# Patient Record
Sex: MALE | NOT HISPANIC OR LATINO | Employment: FULL TIME | URBAN - METROPOLITAN AREA
[De-identification: names, ages, dates, MRNs, and addresses within clinical notes are randomized per-mention and may not be internally consistent; named-entity substitution may affect disease eponyms.]

---

## 2021-03-31 ENCOUNTER — ANCILLARY PROCEDURE (OUTPATIENT)
Dept: GENERAL RADIOLOGY | Facility: CLINIC | Age: 41
End: 2021-03-31
Attending: PHYSICIAN ASSISTANT
Payer: COMMERCIAL

## 2021-03-31 ENCOUNTER — OFFICE VISIT (OUTPATIENT)
Dept: FAMILY MEDICINE | Facility: CLINIC | Age: 41
End: 2021-03-31
Payer: COMMERCIAL

## 2021-03-31 VITALS
RESPIRATION RATE: 16 BRPM | TEMPERATURE: 97.1 F | WEIGHT: 228 LBS | OXYGEN SATURATION: 98 % | DIASTOLIC BLOOD PRESSURE: 82 MMHG | HEART RATE: 62 BPM | BODY MASS INDEX: 30.88 KG/M2 | HEIGHT: 72 IN | SYSTOLIC BLOOD PRESSURE: 126 MMHG

## 2021-03-31 DIAGNOSIS — M79.672 LEFT FOOT PAIN: ICD-10-CM

## 2021-03-31 DIAGNOSIS — M79.672 LEFT FOOT PAIN: Primary | ICD-10-CM

## 2021-03-31 LAB
BASOPHILS # BLD AUTO: 0 10E9/L (ref 0–0.2)
BASOPHILS NFR BLD AUTO: 0.5 %
DIFFERENTIAL METHOD BLD: NORMAL
EOSINOPHIL # BLD AUTO: 0.2 10E9/L (ref 0–0.7)
EOSINOPHIL NFR BLD AUTO: 2 %
ERYTHROCYTE [DISTWIDTH] IN BLOOD BY AUTOMATED COUNT: 12.6 % (ref 10–15)
ERYTHROCYTE [SEDIMENTATION RATE] IN BLOOD BY WESTERGREN METHOD: 14 MM/H (ref 0–15)
HCT VFR BLD AUTO: 44.5 % (ref 40–53)
HGB BLD-MCNC: 14.3 G/DL (ref 13.3–17.7)
LYMPHOCYTES # BLD AUTO: 2.3 10E9/L (ref 0.8–5.3)
LYMPHOCYTES NFR BLD AUTO: 29.1 %
MCH RBC QN AUTO: 29.4 PG (ref 26.5–33)
MCHC RBC AUTO-ENTMCNC: 32.1 G/DL (ref 31.5–36.5)
MCV RBC AUTO: 92 FL (ref 78–100)
MONOCYTES # BLD AUTO: 0.7 10E9/L (ref 0–1.3)
MONOCYTES NFR BLD AUTO: 9.1 %
NEUTROPHILS # BLD AUTO: 4.7 10E9/L (ref 1.6–8.3)
NEUTROPHILS NFR BLD AUTO: 59.3 %
PLATELET # BLD AUTO: 231 10E9/L (ref 150–450)
RBC # BLD AUTO: 4.86 10E12/L (ref 4.4–5.9)
URATE SERPL-MCNC: 6.5 MG/DL (ref 3.5–7.2)
WBC # BLD AUTO: 7.9 10E9/L (ref 4–11)

## 2021-03-31 PROCEDURE — 73630 X-RAY EXAM OF FOOT: CPT | Mod: LT | Performed by: RADIOLOGY

## 2021-03-31 PROCEDURE — 36415 COLL VENOUS BLD VENIPUNCTURE: CPT | Performed by: PHYSICIAN ASSISTANT

## 2021-03-31 PROCEDURE — 85025 COMPLETE CBC W/AUTO DIFF WBC: CPT | Performed by: PHYSICIAN ASSISTANT

## 2021-03-31 PROCEDURE — 84550 ASSAY OF BLOOD/URIC ACID: CPT | Performed by: PHYSICIAN ASSISTANT

## 2021-03-31 PROCEDURE — 85652 RBC SED RATE AUTOMATED: CPT | Performed by: PHYSICIAN ASSISTANT

## 2021-03-31 PROCEDURE — 99213 OFFICE O/P EST LOW 20 MIN: CPT | Performed by: PHYSICIAN ASSISTANT

## 2021-03-31 PROCEDURE — 73610 X-RAY EXAM OF ANKLE: CPT | Mod: LT | Performed by: RADIOLOGY

## 2021-03-31 RX ORDER — INDOMETHACIN 50 MG/1
50 CAPSULE ORAL
Qty: 90 CAPSULE | Refills: 1 | Status: CANCELLED | OUTPATIENT
Start: 2021-03-31

## 2021-03-31 ASSESSMENT — MIFFLIN-ST. JEOR: SCORE: 1987.2

## 2021-03-31 NOTE — PROGRESS NOTES
SUBJECTIVE:   Marquise Garcia is a 40 year old male seen here today for his left Foot  What happened: no injury       Onset: 6 days     Description:   Character: swelling, painful     Intensity: moderate    Progression of Symptoms: better    Accompanying Signs & Symptoms:  Other symptoms: none   History:   Previous similar pain: no       Precipitating factors:   Trauma or overuse: no     Alleviating factors:  Improved by: nothing       Therapies Tried and outcome: ibuprofen help mildly with pain   3/10 now  Was 7-8/10 on fri 26th.   No injury.  No previous problems.  He does drink 1-2 drinks most nights of the week.    ROS:  Constitutional, eye, ENT, skin, respiratory, cardiac, and GI are normal except as otherwise noted.    PFSH:  Past Medical, social, family histories, medications, and allergies were reviewed and updated.     OBJECTIVE:  /82   Pulse 62   Temp 97.1  F (36.2  C) (Tympanic)   Resp 16   Ht 1.829 m (6')   Wt 103.4 kg (228 lb)   SpO2 98%   BMI 30.92 kg/m    General:  Marquise is awake, alert, and cooperative.  NAD.  Left Ankle/ Foot Exam:     examination of his left ankle and foot shows diffuse swelling to the dorsum of his foot and into his ankle.  Mild tenderness diffusely around the medial aspect of his ankle.  He has pain with range of motion but does have full range of motion.  His calves are soft nontender is neuro vas intact distally.    Labs pending  X-ray left ankle and foot: neg. pending radiology    ASSESSMENT:     ICD-10-CM    1. Left foot pain  M79.672 XR Ankle Left G/E 3 Views     CBC with platelets and differential     ESR: Erythrocyte sedimentation rate     Uric acid     XR Foot Left G/E 3 Views       PLAN:   ice or cold packs 20 minutes every 2-3 hrs as needed to relieve pain and swelling, for the first 2 days. Then can apply heat 20 minutes every 2-3 hrs (avoid sleeping on heating pad) there after as needed.   If you can tolerate, start non-steroidal anti-inflammatory  medication like: Ibuprofen 600-800 mg three times daily or Aleve 2 tablets of over the counter strength twice a day as needed.   Tylenol can help with pain also.    Active range of motion exercises encouraged  Activity modification trying to avoid activities that cause you pain.   Follow up 1 wk as needed     Samuel Esteban PA-C

## 2021-03-31 NOTE — LETTER
April 1, 2021      Marquise Garcia  [address] [facility] [address]        Mr. Garcia,     All of your labs were normal/near normal for you.     Please contact the clinic if you have additional questions.  Thank you.     Sincerely,     Samuel Esteban PA-C     Resulted Orders   CBC with platelets and differential   Result Value Ref Range    WBC 7.9 4.0 - 11.0 10e9/L    RBC Count 4.86 4.4 - 5.9 10e12/L    Hemoglobin 14.3 13.3 - 17.7 g/dL    Hematocrit 44.5 40.0 - 53.0 %    MCV 92 78 - 100 fl    MCH 29.4 26.5 - 33.0 pg    MCHC 32.1 31.5 - 36.5 g/dL    RDW 12.6 10.0 - 15.0 %    Platelet Count 231 150 - 450 10e9/L    % Neutrophils 59.3 %    % Lymphocytes 29.1 %    % Monocytes 9.1 %    % Eosinophils 2.0 %    % Basophils 0.5 %    Absolute Neutrophil 4.7 1.6 - 8.3 10e9/L    Absolute Lymphocytes 2.3 0.8 - 5.3 10e9/L    Absolute Monocytes 0.7 0.0 - 1.3 10e9/L    Absolute Eosinophils 0.2 0.0 - 0.7 10e9/L    Absolute Basophils 0.0 0.0 - 0.2 10e9/L    Diff Method Automated Method    ESR: Erythrocyte sedimentation rate   Result Value Ref Range    Sed Rate 14 0 - 15 mm/h   Uric acid   Result Value Ref Range    Uric Acid 6.5 3.5 - 7.2 mg/dL

## 2021-04-01 NOTE — RESULT ENCOUNTER NOTE
MrBeatriz Garcia,    All of your labs were normal/near normal for you.    Please contact the clinic if you have additional questions.  Thank you.    Sincerely,    Samuel Esteban PA-C

## 2021-10-13 NOTE — TELEPHONE ENCOUNTER
Reason for Call:  Medication or medication refill:    Do you use a [facility]?  Name of the pharmacy and phone number for the current request:  [facility] in [place]    Name of the medication requested: There is no prescription refill. They were recommended to call if there were issues with gout in the future, but there isn't a specific anti-inflammatory mentioned in the patient's chart. Advised a virtual visit. Pt's wife declined, stating pt wouldn't want to pay for that.    Pt's wife was advised she is not on file as an emergency contact and I can take in information, but a consent to communicate is helpful for future communication.     Pt may be having symptoms of gout and should be called directly.    Call taken on 10/13/2021 at 3:38 PM by Leti Peacock

## 2021-10-13 NOTE — TELEPHONE ENCOUNTER
"\"The person you called does not have a mailbox that has been set up yet\".      Medications not prescribe previously.      Office visit notes 3/31/21  PLAN:   ice or cold packs 20 minutes every 2-3 hrs as needed to relieve pain and swelling, for the first 2 days. Then can apply heat 20 minutes every 2-3 hrs (avoid sleeping on heating pad) there after as needed.   If you can tolerate, start non-steroidal anti-inflammatory medication like: Ibuprofen 600-800 mg three times daily or Aleve 2 tablets of over the counter strength twice a day as needed.   Tylenol can help with pain also.    Active range of motion exercises encouraged  Activity modification trying to avoid activities that cause you pain.   Follow up 1 wk as needed      Samuel Esteban PA-C     Will need to try call again later.   Terri Hennessy RN     "

## 2021-10-13 NOTE — TELEPHONE ENCOUNTER
Call to patient, discuss concerns, patient reports having gout flare, right foot.    Symptoms started 2 days ago.  Patient is wanting a prescription for the flare.  Reviewed office visit notes from previous office visit, below. Verbalized good understanding.     Took # 1  800 mg dose of Ibuprofen yesterday, and one dose today.  Symptoms are worsening. Cold pack placed yesterday.      Uric Acid   Date Value Ref Range Status   03/31/2021 6.5 3.5 - 7.2 mg/dL Final     No results found for: CR      Please advise  Terri Hennessy RN

## 2021-10-14 NOTE — TELEPHONE ENCOUNTER
Tried to call patient, unable to leave a message, voice mail box is not set up.  María AGARWAL - Caren

## 2021-10-16 ENCOUNTER — TELEPHONE (OUTPATIENT)
Dept: NURSING | Facility: CLINIC | Age: 41
End: 2021-10-16

## 2021-10-16 NOTE — TELEPHONE ENCOUNTER
Pt calling to report that he has another flare of gout at his right ankle, and wants an Rx for the anti-inflammatory medication that was discussed at his first-time gout visit.  Pt states that it should be in his chart.  OTC anti-inflammatories not working.    I advised the pt that he would need to have a virtual visit (as was recommended on 10/13/2021 by Samuel Esteban PA-C), or he can be seen at urgent care for evaluation.    Pt declined both options.  Pt requesting that I forward a message to Dr Kohler, to be addressed on Monday, 10/18/2021.  Teresa Ba RN 10/16/21 3:01 PM  [facility] [place] Nurse Advisor

## 2021-10-19 NOTE — TELEPHONE ENCOUNTER
I have never seen this patient.   I am not going to prescribe for a patient that I have not seen.   I am not sure why this was sent to me.   Reggie Kohler MD

## 2021-10-19 NOTE — TELEPHONE ENCOUNTER
Patient/parent is informed of MD note below, as it is written. Verbalized good understanding.  Offered to schedule appointment, patient declines.   Terri Hennessy RN

## 2021-10-20 NOTE — TELEPHONE ENCOUNTER
see other telephone message from 10/16/21. Patient was offered an appointment by the RN, but the patient declined. Closing this encounter.Roxann Mccarthy MA/WENDI

## 2022-04-15 ENCOUNTER — OFFICE VISIT (OUTPATIENT)
Dept: FAMILY MEDICINE | Facility: CLINIC | Age: 42
End: 2022-04-15
Payer: COMMERCIAL

## 2022-04-15 VITALS
WEIGHT: 228 LBS | HEART RATE: 66 BPM | DIASTOLIC BLOOD PRESSURE: 86 MMHG | BODY MASS INDEX: 30.88 KG/M2 | SYSTOLIC BLOOD PRESSURE: 120 MMHG | TEMPERATURE: 97.7 F | HEIGHT: 72 IN | OXYGEN SATURATION: 97 %

## 2022-04-15 DIAGNOSIS — R00.2 PALPITATIONS: ICD-10-CM

## 2022-04-15 DIAGNOSIS — Z00.00 ROUTINE GENERAL MEDICAL EXAMINATION AT A HEALTH CARE FACILITY: Primary | ICD-10-CM

## 2022-04-15 DIAGNOSIS — Z13.1 SCREENING FOR DIABETES MELLITUS: ICD-10-CM

## 2022-04-15 DIAGNOSIS — K62.5 RECTAL BLEEDING: ICD-10-CM

## 2022-04-15 DIAGNOSIS — F10.20 UNCOMPLICATED ALCOHOL DEPENDENCE (H): ICD-10-CM

## 2022-04-15 DIAGNOSIS — Z13.220 SCREENING FOR HYPERLIPIDEMIA: ICD-10-CM

## 2022-04-15 LAB
ANION GAP SERPL CALCULATED.3IONS-SCNC: 7 MMOL/L (ref 3–14)
BASOPHILS # BLD AUTO: 0 10E3/UL (ref 0–0.2)
BASOPHILS NFR BLD AUTO: 1 %
BUN SERPL-MCNC: 9 MG/DL (ref 7–30)
CALCIUM SERPL-MCNC: 9.2 MG/DL (ref 8.5–10.1)
CHLORIDE BLD-SCNC: 104 MMOL/L (ref 94–109)
CHOLEST SERPL-MCNC: 256 MG/DL
CO2 SERPL-SCNC: 28 MMOL/L (ref 20–32)
CREAT SERPL-MCNC: 0.85 MG/DL (ref 0.66–1.25)
EOSINOPHIL # BLD AUTO: 0.2 10E3/UL (ref 0–0.7)
EOSINOPHIL NFR BLD AUTO: 2 %
ERYTHROCYTE [DISTWIDTH] IN BLOOD BY AUTOMATED COUNT: 12.3 % (ref 10–15)
FASTING STATUS PATIENT QL REPORTED: NO
GFR SERPL CREATININE-BSD FRML MDRD: >90 ML/MIN/1.73M2
GLUCOSE BLD-MCNC: 123 MG/DL (ref 70–99)
HCT VFR BLD AUTO: 47.4 % (ref 40–53)
HDLC SERPL-MCNC: 43 MG/DL
HGB BLD-MCNC: 16.3 G/DL (ref 13.3–17.7)
LDLC SERPL CALC-MCNC: 176 MG/DL
LDLC SERPL CALC-MCNC: ABNORMAL MG/DL
LYMPHOCYTES # BLD AUTO: 1.9 10E3/UL (ref 0.8–5.3)
LYMPHOCYTES NFR BLD AUTO: 25 %
MCH RBC QN AUTO: 30.6 PG (ref 26.5–33)
MCHC RBC AUTO-ENTMCNC: 34.4 G/DL (ref 31.5–36.5)
MCV RBC AUTO: 89 FL (ref 78–100)
MONOCYTES # BLD AUTO: 0.6 10E3/UL (ref 0–1.3)
MONOCYTES NFR BLD AUTO: 8 %
NEUTROPHILS # BLD AUTO: 5 10E3/UL (ref 1.6–8.3)
NEUTROPHILS NFR BLD AUTO: 64 %
NONHDLC SERPL-MCNC: 213 MG/DL
PLATELET # BLD AUTO: 251 10E3/UL (ref 150–450)
POTASSIUM BLD-SCNC: 4.2 MMOL/L (ref 3.4–5.3)
RBC # BLD AUTO: 5.33 10E6/UL (ref 4.4–5.9)
SODIUM SERPL-SCNC: 139 MMOL/L (ref 133–144)
TRIGL SERPL-MCNC: 517 MG/DL
TSH SERPL DL<=0.005 MIU/L-ACNC: 1.09 MU/L (ref 0.4–4)
WBC # BLD AUTO: 7.8 10E3/UL (ref 4–11)

## 2022-04-15 PROCEDURE — 99396 PREV VISIT EST AGE 40-64: CPT | Performed by: NURSE PRACTITIONER

## 2022-04-15 PROCEDURE — 93000 ELECTROCARDIOGRAM COMPLETE: CPT | Performed by: NURSE PRACTITIONER

## 2022-04-15 PROCEDURE — 80061 LIPID PANEL: CPT | Performed by: NURSE PRACTITIONER

## 2022-04-15 PROCEDURE — 80048 BASIC METABOLIC PNL TOTAL CA: CPT | Performed by: NURSE PRACTITIONER

## 2022-04-15 PROCEDURE — 36415 COLL VENOUS BLD VENIPUNCTURE: CPT | Performed by: NURSE PRACTITIONER

## 2022-04-15 PROCEDURE — 84443 ASSAY THYROID STIM HORMONE: CPT | Performed by: NURSE PRACTITIONER

## 2022-04-15 PROCEDURE — 85025 COMPLETE CBC W/AUTO DIFF WBC: CPT | Performed by: NURSE PRACTITIONER

## 2022-04-15 PROCEDURE — 83721 ASSAY OF BLOOD LIPOPROTEIN: CPT | Mod: 59 | Performed by: NURSE PRACTITIONER

## 2022-04-15 PROCEDURE — 99213 OFFICE O/P EST LOW 20 MIN: CPT | Mod: 25 | Performed by: NURSE PRACTITIONER

## 2022-04-15 ASSESSMENT — ENCOUNTER SYMPTOMS
DIZZINESS: 0
EYE PAIN: 0
PARESTHESIAS: 0
CHILLS: 0
PALPITATIONS: 1
FREQUENCY: 0
SHORTNESS OF BREATH: 0
HEMATOCHEZIA: 1
NERVOUS/ANXIOUS: 1
FEVER: 0
HEMATURIA: 0
HEARTBURN: 1
SORE THROAT: 0
MYALGIAS: 1
DIARRHEA: 0
WEAKNESS: 0
ABDOMINAL PAIN: 0
ARTHRALGIAS: 1
CONSTIPATION: 0
HEADACHES: 1
COUGH: 0
JOINT SWELLING: 0

## 2022-04-15 NOTE — PATIENT INSTRUCTIONS
EKG looks okay.   Will check labs.   Recommend cutting back on alcohol and caffeine.  If symptoms persist we can do more testing.     Increase water and fiber intake to help with stools.  If you have more rectal bleeding despite that, we should consider a colonoscopy.       Preventive Health Recommendations  Male Ages 40 to 49    Yearly exam:             See your health care provider every year in order to  o   Review health changes.   o   Discuss preventive care.    o   Review your medicines if your doctor has prescribed any.  You should be tested each year for STDs (sexually transmitted diseases) if you re at risk.   Have a cholesterol test every 5 years.   Have a colonoscopy (test for colon cancer) if someone in your family has had colon cancer or polyps before age 50.   After age 45, have a diabetes test (fasting glucose). If you are at risk for diabetes, you should have this test every 3 years.    Talk with your health care provider about whether or not a prostate cancer screening test (PSA) is right for you.    Shots: Get a flu shot each year. Get a tetanus shot every 10 years.     Nutrition:  Eat at least 5 servings of fruits and vegetables daily.   Eat whole-grain bread, whole-wheat pasta and brown rice instead of white grains and rice.   Get adequate Calcium and Vitamin D.     Lifestyle  Exercise for at least 150 minutes a week (30 minutes a day, 5 days a week). This will help you control your weight and prevent disease.   Limit alcohol to one drink per day.   No smoking.   Wear sunscreen to prevent skin cancer.   See your dentist every six months for an exam and cleaning.

## 2022-04-15 NOTE — PROGRESS NOTES
SUBJECTIVE:   CC: Marquise Garcia is an 42 year old male who presents for preventative health visit.       Having some episodes of fast heart beat for the last month.   Occurring a few times per week.  Usually occurs at rest.  Lasts about 15 minutes. Resolves without intervention.   Heart is noticeably faster but not painful.    No dizziness or shortness of breath.   No new medications.   Alcohol audit score of 16, he doesn't think alcohol is a problem.  Often has caffeine when drinking alcohol, mixed with soda or energy drinks.   Denies family hx of arrhythmia.  Dad- has history of MI.        Blood in his stool once.  About a week ago.  Was bright red. Not in the stool but rather in the toilet water.  No pain.  No hemorrhoids that he is aware of.  Low fiber diet.  No family hx of colon cancer.         Patient has been advised of split billing requirements and indicates understanding: Yes  Healthy Habits:     Getting at least 3 servings of Calcium per day:  Yes    Bi-annual eye exam:  Yes    Dental care twice a year:  Yes    Sleep apnea or symptoms of sleep apnea:  None    Diet:  Regular (no restrictions) and Breakfast skipped    Frequency of exercise:  None    Taking medications regularly:  Yes    Medication side effects:  Not applicable    PHQ-2 Total Score: 0    Additional concerns today:  No      Today's PHQ-2 Score:   PHQ-2 ( 1999 [employer]) 4/15/2022   Q1: Little interest or pleasure in doing things 0   Q2: Feeling down, depressed or hopeless 0   PHQ-2 Score 0   PHQ-2 Total Score (12-17 Years)- Positive if 3 or more points; Administer PHQ-A if positive -   Q1: Little interest or pleasure in doing things Not at all   Q2: Feeling down, depressed or hopeless Not at all   PHQ-2 Score 0       Abuse: Current or Past(Physical, Sexual or Emotional)- No  Do you feel safe in your environment? Yes    Have you ever done Advance Care Planning? (For example, a Health Directive, POLST, or a discussion with a medical provider or  your loved ones about your wishes):     Social History     Tobacco Use     Smoking status: Former Smoker     Smokeless tobacco: Never Used   Substance Use Topics     Alcohol use: Yes     If you drink alcohol do you typically have >3 drinks per day or >7 drinks per week? Yes      Alcohol Use 4/15/2022   Prescreen: >3 drinks/day or >7 drinks/week? Yes   Prescreen: >3 drinks/day or >7 drinks/week? -   AUDIT SCORE  16     AUDIT - Alcohol Use Disorders Identification Test - Reproduced from the World Health Organization Audit 2001 (Second Edition) 4/15/2022   1.  How often do you have a drink containing alcohol? 4 or more times a week   2.  How many drinks containing alcohol do you have on a typical day when you are drinking? 3 or 4   3.  How often do you have five or more drinks on one occasion? Weekly   4.  How often during the last year have you found that you were not able to stop drinking once you had started? Less than monthly   5.  How often during the last year have you failed to do what was normally expected of you because of drinking? Never   6.  How often during the last year have you needed a first drink in the morning to get yourself going after a heavy drinking session? Less than monthly   7.  How often during the last year have you had a feeling of guilt or remorse after drinking? Less than monthly   8.  How often during the last year have you been unable to remember what happened the night before because of your drinking? Less than monthly   9.  Have you or someone else been injured because of your drinking? Yes, but not in the last year   10. Has a relative, friend, doctor or other health care worker been concerned about your drinking or suggested you cut down? Yes, but not in the last year   TOTAL SCORE 16       Last PSA: No results found for: PSA    Reviewed orders with patient. Reviewed health maintenance and updated orders accordingly - Yes      Reviewed and updated as needed this visit by clinical  staff   Tobacco  Allergies  Meds  Problems  Med Hx  Surg Hx  Fam Hx            Reviewed and updated as needed this visit by Provider   Tobacco  Allergies  Meds  Problems  Med Hx  Surg Hx  Fam Hx               Review of Systems   Constitutional: Negative for chills and fever.   HENT: Negative for congestion, ear pain, hearing loss and sore throat.    Eyes: Negative for pain and visual disturbance.   Respiratory: Negative for cough and shortness of breath.    Cardiovascular: Positive for palpitations. Negative for chest pain and peripheral edema.   Gastrointestinal: Positive for heartburn and hematochezia. Negative for abdominal pain, constipation and diarrhea.   Genitourinary: Negative for frequency, genital sores, hematuria, impotence, penile discharge and urgency.   Musculoskeletal: Positive for arthralgias and myalgias. Negative for joint swelling.   Skin: Negative for rash.   Neurological: Positive for headaches. Negative for dizziness, weakness and paresthesias.   Psychiatric/Behavioral: Negative for mood changes. The patient is nervous/anxious.          OBJECTIVE:   /86   Pulse 66   Temp 97.7  F (36.5  C)   Ht 1.829 m (6')   Wt 103.4 kg (228 lb)   SpO2 97%   BMI 30.92 kg/m      Physical Exam  GENERAL: healthy, alert and no distress  EYES: Eyes grossly normal to inspection, PERRL and conjunctivae and sclerae normal  HENT: ear canals and TM's normal, nose and mouth without ulcers or lesions  NECK: no adenopathy, no asymmetry, masses, or scars and thyroid normal to palpation  RESP: lungs clear to auscultation - no rales, rhonchi or wheezes  CV: regular rate and rhythm, normal S1 S2, no S3 or S4, no murmur, click or rub, no peripheral edema and peripheral pulses strong  ABDOMEN: soft, nontender, no hepatosplenomegaly, no masses and bowel sounds normal  MS: no gross musculoskeletal defects noted, no edema  SKIN: no suspicious lesions or rashes  NEURO: Normal strength and tone, mentation  intact and speech normal  PSYCH: mentation appears normal, affect normal/bright    Diagnostic Test Results:  Labs reviewed in Epic    EKG- SB with HR of 59    ASSESSMENT/PLAN:   (Z00.00) Routine general medical examination at a health care facility  (primary encounter diagnosis)  Comment:   Plan: continue annual exams     (Z13.220) Screening for hyperlipidemia  Comment:   Plan: Lipid panel reflex to direct LDL Fasting, LDL         cholesterol direct          (R00.2) Palpitations  Comment: EKG okay.  Labs in process. Discussed cutting back on alcohol and caffeine.  Consider Ziopatch if labs stable and no improvement with lifestyle changes.   Plan: TSH with free T4 reflex, EKG 12-lead complete         w/read - Clinics, CBC with platelets and         differential, Basic metabolic panel  (Ca, Cl,         CO2, Creat, Gluc, K, Na, BUN)          (Z13.1) Screening for diabetes mellitus  Comment:   Plan: Basic metabolic panel  (Ca, Cl, CO2, Creat,         Gluc, K, Na, BUN)          (K62.5) Rectal bleeding  Comment: one episode of blood in toilet with BM.   Plan: Discussed increasing fiber and water, follow-up if recurrence.       (F10.20) Uncomplicated alcohol dependence (H)  Comment: excessive alcohol intake, declines any assistance.  Discussed working on cutting back.    Plan: continue to monitor.       Patient has been advised of split billing requirements and indicates understanding: Yes    COUNSELING:   Reviewed preventive health counseling, as reflected in patient instructions    Estimated body mass index is 30.92 kg/m  as calculated from the following:    Height as of this encounter: 1.829 m (6').    Weight as of this encounter: 103.4 kg (228 lb).     Weight management plan: work on diet and exercise    He reports that he has quit smoking. He has never used smokeless tobacco.      Counseling Resources:  ATP IV Guidelines  Pooled Cohorts Equation Calculator  FRAX Risk Assessment  ICSI Preventive Guidelines  Dietary  Guidelines for Americans, 2010  USDA's MyPlate  ASA Prophylaxis  Lung CA Screening    Amrita Carreon CNP  [facility]

## 2022-04-15 NOTE — LETTER
April 19, 2022      Marquise TREVIÑO Garcia  [address] [facility] [address]        Hi Marquise,     Your blood sugar, total cholesterol, bad cholesterol (LDL) and triglycerides are all high.   I think it is really important to start making some lifestyle changes so you don't have health problems down the road or even in the near future.     Alcohol actually makes your triglycerides go up.  If it gets high enough, you are at risk for pancreatitis or other health problems.  High cholesterol can cause stroke and heart attack.  You could be at risk for diabetes.  Start exercising.  Work on cutting back on alcohol, sweets, and fats in your diet.     Labs should be checked next year when you are fasting.   If you still have heart palpitations despite cutting back on alcohol and caffeine, we should have you wear a heart monitor.     Amrita Carreon CNP       Resulted Orders   Lipid panel reflex to direct LDL Fasting   Result Value Ref Range    Cholesterol 256 (H) <200 mg/dL    Triglycerides 517 (H) <150 mg/dL    Direct Measure HDL 43 >=40 mg/dL    LDL Cholesterol Calculated        Comment:      Cannot estimate LDL when triglyceride exceeds 400 mg/dL    Non HDL Cholesterol 213 (H) <130 mg/dL    Patient Fasting > 8hrs? No     Narrative    Cholesterol  Desirable:  <200 mg/dL    Triglycerides  Normal:  Less than 150 mg/dL  Borderline High:  150-199 mg/dL  High:  200-499 mg/dL  Very High:  Greater than or equal to 500 mg/dL    Direct Measure HDL  Female:  Greater than or equal to 50 mg/dL   Male:  Greater than or equal to 40 mg/dL    LDL Cholesterol  Desirable:  <100mg/dL  Above Desirable:  100-129 mg/dL   Borderline High:  130-159 mg/dL   High:  160-189 mg/dL   Very High:  >= 190 mg/dL    Non HDL Cholesterol  Desirable:  130 mg/dL  Above Desirable:  130-159 mg/dL  Borderline High:  160-189 mg/dL  High:  190-219 mg/dL  Very High:  Greater than or equal to 220 mg/dL   TSH with free T4 reflex   Result Value Ref Range    TSH  1.09 0.40 - 4.00 mU/L   Basic metabolic panel  (Ca, Cl, CO2, Creat, Gluc, K, Na, BUN)   Result Value Ref Range    Sodium 139 133 - 144 mmol/L    Potassium 4.2 3.4 - 5.3 mmol/L    Chloride 104 94 - 109 mmol/L    Carbon Dioxide (CO2) 28 20 - 32 mmol/L    Anion Gap 7 3 - 14 mmol/L    Urea Nitrogen 9 7 - 30 mg/dL    Creatinine 0.85 0.66 - 1.25 mg/dL    Calcium 9.2 8.5 - 10.1 mg/dL    Glucose 123 (H) 70 - 99 mg/dL    GFR Estimate >90 >60 mL/min/1.73m2      Comment:      Effective December 21, 2021 eGFRcr in adults is calculated using the 2021 CKD-EPI creatinine equation which includes age and gender (Manda et al., [facility], DOI: 10.1056/HRYCrk2778145)   CBC with platelets and differential   Result Value Ref Range    WBC Count 7.8 4.0 - 11.0 10e3/uL    RBC Count 5.33 4.40 - 5.90 10e6/uL    Hemoglobin 16.3 13.3 - 17.7 g/dL    Hematocrit 47.4 40.0 - 53.0 %    MCV 89 78 - 100 fL    MCH 30.6 26.5 - 33.0 pg    MCHC 34.4 31.5 - 36.5 g/dL    RDW 12.3 10.0 - 15.0 %    Platelet Count 251 150 - 450 10e3/uL    % Neutrophils 64 %    % Lymphocytes 25 %    % Monocytes 8 %    % Eosinophils 2 %    % Basophils 1 %    Absolute Neutrophils 5.0 1.6 - 8.3 10e3/uL    Absolute Lymphocytes 1.9 0.8 - 5.3 10e3/uL    Absolute Monocytes 0.6 0.0 - 1.3 10e3/uL    Absolute Eosinophils 0.2 0.0 - 0.7 10e3/uL    Absolute Basophils 0.0 0.0 - 0.2 10e3/uL   LDL cholesterol direct   Result Value Ref Range    LDL Cholesterol Direct 176 (H) <100 mg/dL      Comment:      Age 0-19 years:  Desirable: 0-110 mg/dL   Borderline high: 110-129 mg/dL   High: >= 130 mg/dL    Age 20 years and older:  Desirable: <100mg/dL  Above desirable: 100-129 mg/dL   Borderline high: 130-159 mg/dL   High: 160-189 mg/dL   Very high: >= 190 mg/dL